# Patient Record
Sex: MALE | Employment: FULL TIME | URBAN - NONMETROPOLITAN AREA
[De-identification: names, ages, dates, MRNs, and addresses within clinical notes are randomized per-mention and may not be internally consistent; named-entity substitution may affect disease eponyms.]

---

## 2024-09-30 ENCOUNTER — HOSPITAL ENCOUNTER (EMERGENCY)
Facility: HOSPITAL | Age: 39
Discharge: HOME | End: 2024-09-30
Attending: EMERGENCY MEDICINE
Payer: COMMERCIAL

## 2024-09-30 ENCOUNTER — APPOINTMENT (EMERGENCY)
Dept: CT IMAGING | Facility: HOSPITAL | Age: 39
End: 2024-09-30
Payer: COMMERCIAL

## 2024-09-30 ENCOUNTER — TELEPHONE (OUTPATIENT)
Age: 39
End: 2024-09-30

## 2024-09-30 VITALS
HEART RATE: 90 BPM | SYSTOLIC BLOOD PRESSURE: 125 MMHG | RESPIRATION RATE: 18 BRPM | DIASTOLIC BLOOD PRESSURE: 81 MMHG | OXYGEN SATURATION: 90 % | WEIGHT: 273.8 LBS | TEMPERATURE: 99.4 F

## 2024-09-30 DIAGNOSIS — K62.89 ANAL OR RECTAL PAIN: ICD-10-CM

## 2024-09-30 DIAGNOSIS — K64.9 HEMORRHOID: Primary | ICD-10-CM

## 2024-09-30 LAB
ALBUMIN SERPL BCG-MCNC: 4.5 G/DL (ref 3.5–5)
ALP SERPL-CCNC: 49 U/L (ref 34–104)
ALT SERPL W P-5'-P-CCNC: 24 U/L (ref 7–52)
ANION GAP SERPL CALCULATED.3IONS-SCNC: 7 MMOL/L (ref 4–13)
AST SERPL W P-5'-P-CCNC: 24 U/L (ref 13–39)
BASOPHILS # BLD AUTO: 0.04 THOUSANDS/ÂΜL (ref 0–0.1)
BASOPHILS NFR BLD AUTO: 1 % (ref 0–1)
BILIRUB SERPL-MCNC: 1.01 MG/DL (ref 0.2–1)
BUN SERPL-MCNC: 16 MG/DL (ref 5–25)
CALCIUM SERPL-MCNC: 9.7 MG/DL (ref 8.4–10.2)
CHLORIDE SERPL-SCNC: 100 MMOL/L (ref 96–108)
CO2 SERPL-SCNC: 27 MMOL/L (ref 21–32)
CREAT SERPL-MCNC: 0.83 MG/DL (ref 0.6–1.3)
EOSINOPHIL # BLD AUTO: 0.09 THOUSAND/ÂΜL (ref 0–0.61)
EOSINOPHIL NFR BLD AUTO: 1 % (ref 0–6)
ERYTHROCYTE [DISTWIDTH] IN BLOOD BY AUTOMATED COUNT: 13.2 % (ref 11.6–15.1)
GFR SERPL CREATININE-BSD FRML MDRD: 112 ML/MIN/1.73SQ M
GLUCOSE SERPL-MCNC: 95 MG/DL (ref 65–140)
HCT VFR BLD AUTO: 41.7 % (ref 36.5–49.3)
HGB BLD-MCNC: 13.9 G/DL (ref 12–17)
IMM GRANULOCYTES # BLD AUTO: 0.01 THOUSAND/UL (ref 0–0.2)
IMM GRANULOCYTES NFR BLD AUTO: 0 % (ref 0–2)
LYMPHOCYTES # BLD AUTO: 2.74 THOUSANDS/ÂΜL (ref 0.6–4.47)
LYMPHOCYTES NFR BLD AUTO: 40 % (ref 14–44)
MCH RBC QN AUTO: 28.8 PG (ref 26.8–34.3)
MCHC RBC AUTO-ENTMCNC: 33.3 G/DL (ref 31.4–37.4)
MCV RBC AUTO: 87 FL (ref 82–98)
MONOCYTES # BLD AUTO: 0.81 THOUSAND/ÂΜL (ref 0.17–1.22)
MONOCYTES NFR BLD AUTO: 12 % (ref 4–12)
NEUTROPHILS # BLD AUTO: 3.2 THOUSANDS/ÂΜL (ref 1.85–7.62)
NEUTS SEG NFR BLD AUTO: 46 % (ref 43–75)
NRBC BLD AUTO-RTO: 0 /100 WBCS
PLATELET # BLD AUTO: 371 THOUSANDS/UL (ref 149–390)
PMV BLD AUTO: 9.5 FL (ref 8.9–12.7)
POTASSIUM SERPL-SCNC: 3.9 MMOL/L (ref 3.5–5.3)
PROT SERPL-MCNC: 7.6 G/DL (ref 6.4–8.4)
RBC # BLD AUTO: 4.82 MILLION/UL (ref 3.88–5.62)
SODIUM SERPL-SCNC: 134 MMOL/L (ref 135–147)
WBC # BLD AUTO: 6.89 THOUSAND/UL (ref 4.31–10.16)

## 2024-09-30 PROCEDURE — 80053 COMPREHEN METABOLIC PANEL: CPT | Performed by: EMERGENCY MEDICINE

## 2024-09-30 PROCEDURE — 99283 EMERGENCY DEPT VISIT LOW MDM: CPT

## 2024-09-30 PROCEDURE — 85025 COMPLETE CBC W/AUTO DIFF WBC: CPT | Performed by: EMERGENCY MEDICINE

## 2024-09-30 PROCEDURE — 96374 THER/PROPH/DIAG INJ IV PUSH: CPT

## 2024-09-30 PROCEDURE — 36415 COLL VENOUS BLD VENIPUNCTURE: CPT | Performed by: EMERGENCY MEDICINE

## 2024-09-30 PROCEDURE — 72193 CT PELVIS W/DYE: CPT

## 2024-09-30 PROCEDURE — 99284 EMERGENCY DEPT VISIT MOD MDM: CPT | Performed by: EMERGENCY MEDICINE

## 2024-09-30 RX ORDER — KETOROLAC TROMETHAMINE 30 MG/ML
30 INJECTION, SOLUTION INTRAMUSCULAR; INTRAVENOUS ONCE
Status: COMPLETED | OUTPATIENT
Start: 2024-09-30 | End: 2024-09-30

## 2024-09-30 RX ORDER — HYDROCORTISONE ACETATE 25 MG/1
25 SUPPOSITORY RECTAL 2 TIMES DAILY
Qty: 12 SUPPOSITORY | Refills: 0 | Status: SHIPPED | OUTPATIENT
Start: 2024-09-30

## 2024-09-30 RX ADMIN — IOHEXOL 75 ML: 350 INJECTION, SOLUTION INTRAVENOUS at 10:15

## 2024-09-30 RX ADMIN — KETOROLAC TROMETHAMINE 30 MG: 30 INJECTION, SOLUTION INTRAMUSCULAR at 09:33

## 2024-09-30 NOTE — TELEPHONE ENCOUNTER
Pt called to set up OV w/ Dr Calhoun - soonest was Nov 1 - he wanted sooner appt -  offered toisee if the others in the office had anything sooner  -- he wants the Dr he was referred to -- will call his Dr to see if he can refer a different doctor.

## 2024-09-30 NOTE — ED PROVIDER NOTES
Final diagnoses:   Hemorrhoid   Anal or rectal pain     ED Disposition       ED Disposition   Discharge    Condition   Stable    Date/Time   Mon Sep 30, 2024 10:53 AM    Comment   Wai CHAMBERLAIN Akilah discharge to home/self care.                   Assessment & Plan       Medical Decision Making  0904: Patient appears well, vital signs reviewed.  Patient is noted to have an external, nonthrombosed hemorrhoid without active bleeding.  The patient has not a referral to colorectal specialist 10/16.  With ongoing pain, low-grade fever and chills, plan to complete basic labs and CT pelvis to further evaluate the pain in this region.    1101: CT and labs reviewed.  Stable for discharge.  Follow-up with colorectal specialist.    Amount and/or Complexity of Data Reviewed  Labs: ordered.  Radiology: ordered.     Details: CT pelvis--no acute pathology    Risk  Prescription drug management.             Medications   ketorolac (TORADOL) injection 30 mg (30 mg Intravenous Given 9/30/24 0933)   iohexol (OMNIPAQUE) 350 MG/ML injection (MULTI-DOSE) 75 mL (75 mL Intravenous Given 9/30/24 1015)       ED Risk Strat Scores                           SBIRT 22yo+      Flowsheet Row Most Recent Value   Initial Alcohol Screen: US AUDIT-C     1. How often do you have a drink containing alcohol? 0 Filed at: 09/30/2024 0911   2. How many drinks containing alcohol do you have on a typical day you are drinking?  0 Filed at: 09/30/2024 0911   3a. Male UNDER 65: How often do you have five or more drinks on one occasion? 0 Filed at: 09/30/2024 0911   Audit-C Score 0 Filed at: 09/30/2024 0911   MATTY: How many times in the past year have you...    Used an illegal drug or used a prescription medication for non-medical reasons? Never Filed at: 09/30/2024 0911                            History of Present Illness       Chief Complaint   Patient presents with    Medical Problem     Pt states he was seen last week for hemorrhoids and treated with po  medication.  Pt stating the pain is getting worse, chills and is unable to get into speciality physician until 10/16.       No past medical history on file.   No past surgical history on file.   No family history on file.       No existing history information found.   No existing history information found.   I have reviewed and agree with the history as documented.       History provided by:  Medical records and patient  Medical Problem  Location:  Anal pain  Severity:  Moderate  Onset quality:  Gradual  Duration:  1 week  Timing:  Constant  Progression:  Unchanged  Chronicity:  New  Context:  Patient states he has developed some rectal pain over the past week, seen at [facility], found to have external hemorrhoid, referred to [facility] colorectal, has an appointment on 10/16, ongoing pain is brought in back care  Relieved by:  Nothing  Worsened by:  Sitting on region  Ineffective treatments:  Hydrocortisone cream and MiraLAX  Associated symptoms: no abdominal pain, no chest pain, no congestion, no cough, no diarrhea, no ear pain, no fatigue, no fever, no headaches, no loss of consciousness, no myalgias, no nausea, no rash, no rhinorrhea, no shortness of breath, no sore throat, no vomiting and no wheezing    Risk factors:   for [employer]      Review of Systems   Constitutional:  Positive for chills. Negative for appetite change, fatigue and fever.   HENT:  Negative for congestion, ear pain, rhinorrhea, sore throat and trouble swallowing.    Eyes:  Negative for pain, discharge and visual disturbance.   Respiratory:  Negative for cough, chest tightness, shortness of breath and wheezing.    Cardiovascular:  Negative for chest pain and palpitations.   Gastrointestinal:  Positive for rectal pain. Negative for abdominal distention, abdominal pain, anal bleeding, blood in stool, constipation, diarrhea, nausea and vomiting.   Endocrine: Negative for polydipsia, polyphagia and polyuria.   Genitourinary:  Negative  for difficulty urinating, dysuria, hematuria and testicular pain.   Musculoskeletal:  Negative for arthralgias, back pain and myalgias.   Skin:  Negative for color change and rash.   Allergic/Immunologic: Negative for immunocompromised state.   Neurological:  Negative for dizziness, seizures, loss of consciousness, syncope, weakness and headaches.   Hematological:  Negative for adenopathy.   Psychiatric/Behavioral:  Negative for confusion and dysphoric mood.    All other systems reviewed and are negative.          Objective       ED Triage Vitals [09/30/24 0909]   Temperature Pulse Blood Pressure Respirations SpO2 Patient Position - Orthostatic VS   99.4 °F (37.4 °C) (!) 111 (!) 130/111 18 99 % Sitting      Temp Source Heart Rate Source BP Location FiO2 (%) Pain Score    Oral -- Left arm -- 7      Vitals      Date and Time Temp Pulse SpO2 Resp BP Pain Score FACES Pain Rating User   09/30/24 0933 -- -- -- -- -- 8 --    09/30/24 0909 99.4 °F (37.4 °C) 111 99 % 18 130/111 7 -- ACM            Physical Exam  Vitals and nursing note reviewed.   Constitutional:       General: He is not in acute distress.     Appearance: Normal appearance. He is not ill-appearing, toxic-appearing or diaphoretic.   HENT:      Head: Normocephalic and atraumatic.      Nose: Nose normal. No congestion or rhinorrhea.      Mouth/Throat:      Mouth: Mucous membranes are moist.      Pharynx: Oropharynx is clear. No oropharyngeal exudate or posterior oropharyngeal erythema.   Eyes:      General:         Right eye: No discharge.         Left eye: No discharge.   Cardiovascular:      Rate and Rhythm: Normal rate and regular rhythm.      Pulses: Normal pulses.      Heart sounds: Normal heart sounds. No murmur heard.     No gallop.   Pulmonary:      Effort: Pulmonary effort is normal. No respiratory distress.      Breath sounds: Normal breath sounds. No stridor. No wheezing, rhonchi or rales.   Chest:      Chest wall: No tenderness.   Abdominal:       General: Bowel sounds are normal. There is no distension.      Palpations: Abdomen is soft. There is no mass.      Tenderness: There is no abdominal tenderness. There is no right CVA tenderness, left CVA tenderness, guarding or rebound.      Hernia: No hernia is present.   Genitourinary:     Rectum: Guaiac result negative.      Comments: There is a nonthrombosed hemorrhoid at the 9 o'clock region from the lithotomy position, tender to palpation, no active bleeding, no gross perianal abscess or erythema.  Musculoskeletal:         General: Normal range of motion.      Cervical back: Normal range of motion and neck supple.   Skin:     General: Skin is warm and dry.      Capillary Refill: Capillary refill takes less than 2 seconds.   Neurological:      General: No focal deficit present.      Mental Status: He is alert and oriented to person, place, and time.      Cranial Nerves: No cranial nerve deficit.      Sensory: No sensory deficit.      Motor: No weakness.      Coordination: Coordination normal.      Gait: Gait normal.      Deep Tendon Reflexes: Reflexes normal.   Psychiatric:         Mood and Affect: Mood normal.         Behavior: Behavior normal.         Thought Content: Thought content normal.         Judgment: Judgment normal.         Results Reviewed       Procedure Component Value Units Date/Time    Comprehensive metabolic panel [[id number]]  (Abnormal) Collected: 09/30/24 0933    Lab Status: Final result Specimen: Blood from Arm, Left Updated: 09/30/24 1001     Sodium 134 mmol/L      Potassium 3.9 mmol/L      Chloride 100 mmol/L      CO2 27 mmol/L      ANION GAP 7 mmol/L      BUN 16 mg/dL      Creatinine 0.83 mg/dL      Glucose 95 mg/dL      Calcium 9.7 mg/dL      AST 24 U/L      ALT 24 U/L      Alkaline Phosphatase 49 U/L      Total Protein 7.6 g/dL      Albumin 4.5 g/dL      Total Bilirubin 1.01 mg/dL      eGFR 112 ml/min/1.73sq m     Narrative:      National Kid[employer] Disease Foundation guidelines for  Chronic Kidney Disease (CKD):     Stage 1 with normal or high GFR (GFR > 90 mL/min/1.73 square meters)    Stage 2 Mild CKD (GFR = 60-89 mL/min/1.73 square meters)    Stage 3A Moderate CKD (GFR = 45-59 mL/min/1.73 square meters)    Stage 3B Moderate CKD (GFR = 30-44 mL/min/1.73 square meters)    Stage 4 Severe CKD (GFR = 15-29 mL/min/1.73 square meters)    Stage 5 End Stage CKD (GFR <15 mL/min/1.73 square meters)  Note: GFR calculation is accurate only with a steady state creatinine    CBC and differential [[id number]] Collected: 09/30/24 0933    Lab Status: Final result Specimen: Blood from Arm, Left Updated: 09/30/24 0938     WBC 6.89 Thousand/uL      RBC 4.82 Million/uL      Hemoglobin 13.9 g/dL      Hematocrit 41.7 %      MCV 87 fL      MCH 28.8 pg      MCHC 33.3 g/dL      RDW 13.2 %      MPV 9.5 fL      Platelets 371 Thousands/uL      nRBC 0 /100 WBCs      Segmented % 46 %      Immature Grans % 0 %      Lymphocytes % 40 %      Monocytes % 12 %      Eosinophils Relative 1 %      Basophils Relative 1 %      Absolute Neutrophils 3.20 Thousands/µL      Absolute Immature Grans 0.01 Thousand/uL      Absolute Lymphocytes 2.74 Thousands/µL      Absolute Monocytes 0.81 Thousand/µL      Eosinophils Absolute 0.09 Thousand/µL      Basophils Absolute 0.04 Thousands/µL             CT pelvis w contrast   Final Interpretation by Adonis Lozano MD (09/30 1053)      No acute findings in the pelvis.      Workstation performed: [id number]             Procedures    ED Medication and Procedure Management   None     Patient's Medications   Discharge Prescriptions    HYDROCORTISONE (ANUSOL-HC) 25 MG SUPPOSITORY    Insert 1 suppository (25 mg total) into the rectum 2 (two) times a day       Start Date: 9/30/2024 End Date: --       Order Dose: 25 mg       Quantity: 12 suppository    Refills: 0     No discharge procedures on file.  ED SEPSIS DOCUMENTATION   Time reflects when diagnosis was documented in both MDM as applicable and the  Disposition within this note       Time User Action Codes Description Comment    9/30/2024 10:51 AM Reinaldo Mcneil [K64.9] Hemorrhoid     9/30/2024 10:51 AM Reinaldo Mcneil [K62.89] Anal or rectal pain                  Reinaldo Mcneil MD  09/30/24 1102

## 2024-09-30 NOTE — Clinical Note
Wai Isbell was seen and treated in our emergency department on 9/30/2024.                Diagnosis:     Wai  may return to work on return date.    He may return on this date: 10/03/2024         If you have any questions or concerns, please don't hesitate to call.      Reinaldo Mcneil MD    ______________________________           _______________          _______________  Hospital Representative                              Date                                Time